# Patient Record
Sex: FEMALE | Race: WHITE | Employment: OTHER | URBAN - METROPOLITAN AREA
[De-identification: names, ages, dates, MRNs, and addresses within clinical notes are randomized per-mention and may not be internally consistent; named-entity substitution may affect disease eponyms.]

---

## 2024-01-03 NOTE — PROGRESS NOTES
[facility] History & Physical                 Reason for Visit: Establish care    Subjective:     Patient is a 78 y.o. female with a PMH of hyperlipidemia, bilateral carotid atherosclerosis, interatrial cardiac shunt (documented as PFO), cervical dystonia, CVA and thyroid cancer who presents as a referral to establish care.  The patient was last seen by cardiology at [facility] in September 2023.  It was noted that she had a normal stress test in the setting of chest pain and largely unrevealing ambulatory ECG monitor.  The patient had a TTE in March 2023 that was noted to demonstrate a normal EF and interatrial shunt.  Right-sided chambers were noted to be normal size.  She denies angina.  The patient reports ANGELA with house work and bending over at home.  She reports chronic ANGELA for years.      No past medical history on file.   No past surgical history on file.   No family history on file.   Social History     Tobacco Use    Smoking status: Not on file    Smokeless tobacco: Not on file   Substance Use Topics    Alcohol use: Not on file      Not on File      ROS:  No obvious pertinent positives on review of systems except for what was outlined above.       Objective:       BP (!) 166/84   Pulse 61   Wt 58.4 kg (128 lb 12.8 oz)     BP Readings from Last 3 Encounters:   01/04/24 (!) 166/84       Wt Readings from Last 3 Encounters:   01/04/24 58.4 kg (128 lb 12.8 oz)       General/Constitutional:   Alert and oriented x 3, no acute distress  HEENT:   normocephalic, atraumatic, moist mucous membranes  Neck:   No JVD or carotid bruits bilaterally  Cardiovascular:   regular rate and rhythm, no rub/gallop appreciated  Pulmonary:   clear to auscultation bilaterally, no respiratory distress  Abdomen:   soft, non-tender, non-distended  Ext:   No sig LE edema bilaterally  Skin:  warm and dry, no obvious rashes seen  Neuro:   no obvious sensory or motor deficits  Psychiatric:   normal mood and affect    ECG:   Sinus rhythm

## 2024-01-04 ENCOUNTER — INITIAL CONSULT (OUTPATIENT)
Age: 79
End: 2024-01-04
Payer: MEDICARE

## 2024-01-04 VITALS — WEIGHT: 128.8 LBS | DIASTOLIC BLOOD PRESSURE: 84 MMHG | SYSTOLIC BLOOD PRESSURE: 166 MMHG | HEART RATE: 61 BPM

## 2024-01-04 DIAGNOSIS — R06.09 CHRONIC DYSPNEA: ICD-10-CM

## 2024-01-04 DIAGNOSIS — Z92.89 HISTORY OF DIAGNOSTIC TESTS: ICD-10-CM

## 2024-01-04 DIAGNOSIS — Q24.8 INTERATRIAL CARDIAC SHUNT: Primary | ICD-10-CM

## 2024-01-04 PROBLEM — R69 ILL-DEFINED CONDITION: Status: ACTIVE | Noted: 2024-01-04

## 2024-01-04 PROCEDURE — G8484 FLU IMMUNIZE NO ADMIN: HCPCS | Performed by: INTERNAL MEDICINE

## 2024-01-04 PROCEDURE — 1090F PRES/ABSN URINE INCON ASSESS: CPT | Performed by: INTERNAL MEDICINE

## 2024-01-04 PROCEDURE — 99204 OFFICE O/P NEW MOD 45 MIN: CPT | Performed by: INTERNAL MEDICINE

## 2024-01-04 PROCEDURE — 1036F TOBACCO NON-USER: CPT | Performed by: INTERNAL MEDICINE

## 2024-01-04 PROCEDURE — 1123F ACP DISCUSS/DSCN MKR DOCD: CPT | Performed by: INTERNAL MEDICINE

## 2024-01-04 PROCEDURE — G8421 BMI NOT CALCULATED: HCPCS | Performed by: INTERNAL MEDICINE

## 2024-01-04 PROCEDURE — 93000 ELECTROCARDIOGRAM COMPLETE: CPT | Performed by: INTERNAL MEDICINE

## 2024-01-04 PROCEDURE — G8427 DOCREV CUR MEDS BY ELIG CLIN: HCPCS | Performed by: INTERNAL MEDICINE

## 2024-01-04 PROCEDURE — G8400 PT W/DXA NO RESULTS DOC: HCPCS | Performed by: INTERNAL MEDICINE

## 2024-01-04 RX ORDER — ALBUTEROL SULFATE 90 UG/1
2 AEROSOL, METERED RESPIRATORY (INHALATION) EVERY 6 HOURS PRN
COMMUNITY
Start: 2022-10-24

## 2024-01-04 RX ORDER — LEVOTHYROXINE SODIUM 88 MCG
TABLET ORAL
COMMUNITY

## 2024-01-04 RX ORDER — BACLOFEN 10 MG/1
TABLET ORAL
COMMUNITY
Start: 2022-03-16

## 2024-01-04 RX ORDER — ASPIRIN 81 MG/1
1 TABLET ORAL DAILY
COMMUNITY
Start: 2014-10-03

## 2024-01-04 RX ORDER — ATENOLOL 25 MG/1
25 TABLET ORAL DAILY
COMMUNITY
Start: 2022-02-18

## 2024-02-07 ENCOUNTER — OFFICE VISIT (OUTPATIENT)
Dept: NEUROLOGY | Age: 79
End: 2024-02-07
Payer: MEDICARE

## 2024-02-07 VITALS
HEART RATE: 64 BPM | BODY MASS INDEX: 21.73 KG/M2 | DIASTOLIC BLOOD PRESSURE: 94 MMHG | WEIGHT: 130.4 LBS | OXYGEN SATURATION: 98 % | HEIGHT: 65 IN | SYSTOLIC BLOOD PRESSURE: 155 MMHG

## 2024-02-07 DIAGNOSIS — G24.3 CERVICAL DYSTONIA: Primary | ICD-10-CM

## 2024-02-07 DIAGNOSIS — M62.838 MUSCLE SPASM: ICD-10-CM

## 2024-02-07 PROCEDURE — 99204 OFFICE O/P NEW MOD 45 MIN: CPT | Performed by: PSYCHIATRY & NEUROLOGY

## 2024-02-07 PROCEDURE — 1123F ACP DISCUSS/DSCN MKR DOCD: CPT | Performed by: PSYCHIATRY & NEUROLOGY

## 2024-02-07 PROCEDURE — G8484 FLU IMMUNIZE NO ADMIN: HCPCS | Performed by: PSYCHIATRY & NEUROLOGY

## 2024-02-07 PROCEDURE — G8427 DOCREV CUR MEDS BY ELIG CLIN: HCPCS | Performed by: PSYCHIATRY & NEUROLOGY

## 2024-02-07 PROCEDURE — 1090F PRES/ABSN URINE INCON ASSESS: CPT | Performed by: PSYCHIATRY & NEUROLOGY

## 2024-02-07 PROCEDURE — G8420 CALC BMI NORM PARAMETERS: HCPCS | Performed by: PSYCHIATRY & NEUROLOGY

## 2024-02-07 PROCEDURE — G8400 PT W/DXA NO RESULTS DOC: HCPCS | Performed by: PSYCHIATRY & NEUROLOGY

## 2024-02-07 PROCEDURE — 1036F TOBACCO NON-USER: CPT | Performed by: PSYCHIATRY & NEUROLOGY

## 2024-02-07 ASSESSMENT — ENCOUNTER SYMPTOMS
COUGH: 0
ABDOMINAL PAIN: 0
VOICE CHANGE: 0

## 2024-02-07 NOTE — PROGRESS NOTES
otherwise is walking unassisted with no history of recent falls.  She reports a prior MRI of the brain that showed a \"silent\" cerebellar infarct.  There was also noted to be some small vessel ischemic change.      Records show the following injection pattern from 11/27/2023:   Left scalene 40 units  Left splenius capitis 40 units  Left semispinalis 20 units  Left levator 30 units  Left trapezius 40 units  Left SCM 40 minutes  Left cervical paraspinals 5 units in 2 locations  Right SCM 40 units  Right trapezius 30 units  Right levator 10 units  Total of 300 units    Review of Systems:   Review of Systems   Constitutional:  Negative for fever.   HENT:  Negative for voice change.    Eyes:  Negative for visual disturbance.   Respiratory:  Negative for cough.    Cardiovascular:  Negative for chest pain.   Gastrointestinal:  Negative for abdominal pain.   Genitourinary:  Negative for dysuria.   Musculoskeletal:  Positive for gait problem and neck pain.   Skin:  Negative for rash.   Allergic/Immunologic: Negative for immunocompromised state.   Neurological:  Positive for tremors (cervical dystonia) and weakness.   Psychiatric/Behavioral:  Negative for hallucinations and sleep disturbance.        Lab/Imaging Review:   I REVIEWED PERTINENT LABS, IMAGES, AND REPORTS WITH THE PATIENT PERSONALLY, DIRECTLY AND FULLY. THE MOST PERTINENT FINDINGS ARE NOTED BELOW:    MRI Brain January 2021:  1. The right vertebral artery and the right PICA loop are mildly diminutive compared to the left. There is a low volume area of linear encephalomalacia in the left cerebellar hemisphere.   2. In the supratentorial department there is mild atrophy and moderate white matter disease which is nonspecific but consistent with chronic small vessel ischemia.   3. MRA neck and MRA brain are otherwise unremarkable.     MRA Head January 2021:  1. The right vertebral artery and the right PICA loop are mildly diminutive compared to the left. There is a low

## 2024-02-29 ENCOUNTER — OFFICE VISIT (OUTPATIENT)
Dept: NEUROLOGY | Age: 79
End: 2024-02-29

## 2024-02-29 VITALS
HEIGHT: 65 IN | DIASTOLIC BLOOD PRESSURE: 77 MMHG | WEIGHT: 130 LBS | SYSTOLIC BLOOD PRESSURE: 171 MMHG | HEART RATE: 80 BPM | BODY MASS INDEX: 21.66 KG/M2

## 2024-02-29 DIAGNOSIS — G24.3 CERVICAL DYSTONIA: Primary | ICD-10-CM

## 2024-02-29 NOTE — PROGRESS NOTES
[facility]UR[facility]  [address], [address]  [place] [address]  Phone: [phone] Fax [phone]  Dr. Andrade Jennings        Patient: Mrs. Kathy Turk  Provider: Andrade Jennings MD     Procedure note:  Botulinum Toxin injections     Indication: Cervical Dystonia        Subjective:      She is unaccompanied for today's visit.  She was last seen for her initial consultation earlier this month in February 2024.  See prior note for details.    Briefly, patient was previously followed at the [facility] for cervical dystonia, diagnosed in approximately 2018.  She has been receiving Botox injections over the last several years with her last injection being in November 2023.  I have reviewed prior records and prior injection patterns.  Previous trials of Xeomin are noted in the records but currently she is receiving Botox 300 units every 3 months.  She recently moved to the [place] area to be closer to her children.      Symptoms are described as a sensation and increased neck stiffness and a tendency for head pull to the left which is worse when performing activities.  She continues to take baclofen 4 times a day for symptomatic management in addition to the Botox injections.  She denies any other prior medication trials.    This will be her first injection with us.  She is well aware of the benefits and potential risks of Botox injections.  She denies any prior complications other than some mild swallowing difficulty with a previous injection. Overall injections have been well-tolerated with no adverse effects and they have been beneficial at reducing the sensation of pulling and muscle spasm within her neck.  She can notice some wearing off prior to her next injection over the last couple of weeks.    Denies any weakness or tremor of the upper extremities.  She notes some mild balance difficulty but otherwise is walking unassisted with no history of recent falls. She reports a prior MRI of

## 2024-03-14 NOTE — PROGRESS NOTES
the morning and at bedtime Zafinlukast     No current facility-administered medications for this visit.         SUBJECTIVE:  Review of Systems   Constitutional:  Negative for fatigue and unexpected weight change.   HENT:  Positive for congestion.         Positive for nasal polyp.  Positive for nasal septal deviation   Eyes: Negative.    Respiratory:  Positive for shortness of breath.         Rarely has shortness of breath has asthma   Cardiovascular: Negative.    Gastrointestinal: Negative.    Endocrine: Negative.    Genitourinary: Negative.    Musculoskeletal:  Positive for neck pain and neck stiffness.   Skin: Negative.    Allergic/Immunologic: Negative.    Neurological: Negative.    Hematological: Negative.    Psychiatric/Behavioral:  Positive for sleep disturbance.         Allergies   Allergen Reactions    Acetaminophen     Doxycycline Other (See Comments)     Skin red    Skin-Red    Other reaction(s): Other (See Comments), reaction, Unknown-Unspecified   Caused skin to turn very red   Skin red   Reaction: skin redness   Reaction: skin redness    Caused skin to turn very red    Reaction: skin redness    Hydrocodone     Streptomycin Other (See Comments)     Pins & Needles sensation   Other reaction(s): Other (See Comments), reaction, Unknown-Unspecified   Pins and needles sensation   Other reaction(s): Other (See Comments)   Pins and needles sensation   Reaction: tingling    Pins and needles sensation    Other reaction(s): Other (See Comments)   Pins and needles sensation    Reaction: tingling    Strawberry Other (See Comments)     Other reaction(s): Diarrhea-Intolerance   Other reaction(s): Diarrhea-Intolerance    Other reaction(s): Diarrhea-Intolerance    Sulfa Antibiotics Other (See Comments)     fatigue    Fatigue   Other reaction(s): Other (See Comments)   Extreme Fatigue   Other reaction(s): reaction   Other reaction(s): Other (see comments), Unknown-Unspecified   fatigue   Other reaction(s):

## 2024-03-15 ENCOUNTER — OFFICE VISIT (OUTPATIENT)
Dept: SLEEP MEDICINE | Age: 79
End: 2024-03-15
Payer: MEDICARE

## 2024-03-15 VITALS
DIASTOLIC BLOOD PRESSURE: 71 MMHG | SYSTOLIC BLOOD PRESSURE: 169 MMHG | HEART RATE: 76 BPM | OXYGEN SATURATION: 96 % | WEIGHT: 131 LBS | TEMPERATURE: 97.2 F | HEIGHT: 65 IN | RESPIRATION RATE: 16 BRPM | BODY MASS INDEX: 21.83 KG/M2

## 2024-03-15 DIAGNOSIS — R35.1 NOCTURIA: ICD-10-CM

## 2024-03-15 DIAGNOSIS — J30.89 NON-SEASONAL ALLERGIC RHINITIS, UNSPECIFIED TRIGGER: ICD-10-CM

## 2024-03-15 DIAGNOSIS — G47.33 OSA (OBSTRUCTIVE SLEEP APNEA): Primary | ICD-10-CM

## 2024-03-15 PROCEDURE — 1123F ACP DISCUSS/DSCN MKR DOCD: CPT | Performed by: INTERNAL MEDICINE

## 2024-03-15 PROCEDURE — 1036F TOBACCO NON-USER: CPT | Performed by: INTERNAL MEDICINE

## 2024-03-15 PROCEDURE — G8484 FLU IMMUNIZE NO ADMIN: HCPCS | Performed by: INTERNAL MEDICINE

## 2024-03-15 PROCEDURE — G8427 DOCREV CUR MEDS BY ELIG CLIN: HCPCS | Performed by: INTERNAL MEDICINE

## 2024-03-15 PROCEDURE — G8420 CALC BMI NORM PARAMETERS: HCPCS | Performed by: INTERNAL MEDICINE

## 2024-03-15 PROCEDURE — 1090F PRES/ABSN URINE INCON ASSESS: CPT | Performed by: INTERNAL MEDICINE

## 2024-03-15 PROCEDURE — 99204 OFFICE O/P NEW MOD 45 MIN: CPT | Performed by: INTERNAL MEDICINE

## 2024-03-15 PROCEDURE — G8400 PT W/DXA NO RESULTS DOC: HCPCS | Performed by: INTERNAL MEDICINE

## 2024-03-15 RX ORDER — CHLORAL HYDRATE 500 MG
1000 CAPSULE ORAL DAILY
COMMUNITY

## 2024-03-15 RX ORDER — ZAFIRLUKAST 20 MG/1
20 TABLET, FILM COATED ORAL 2 TIMES DAILY
COMMUNITY
Start: 2024-03-11

## 2024-03-15 RX ORDER — MAGNESIUM OXIDE/MAG AA CHELATE 300 MG
350 CAPSULE ORAL DAILY
COMMUNITY

## 2024-03-15 RX ORDER — FAMOTIDINE 20 MG
1600 TABLET ORAL DAILY
COMMUNITY

## 2024-03-15 RX ORDER — ASCORBIC ACID 500 MG
500 TABLET ORAL 2 TIMES DAILY
COMMUNITY

## 2024-03-15 ASSESSMENT — SLEEP AND FATIGUE QUESTIONNAIRES
HOW LIKELY ARE YOU TO NOD OFF OR FALL ASLEEP WHILE SITTING QUIETLY AFTER LUNCH WITHOUT ALCOHOL: 0
HOW LIKELY ARE YOU TO NOD OFF OR FALL ASLEEP WHILE WATCHING TV: 1
HOW LIKELY ARE YOU TO NOD OFF OR FALL ASLEEP WHILE SITTING AND TALKING TO SOMEONE: 0
HOW LIKELY ARE YOU TO NOD OFF OR FALL ASLEEP IN A CAR, WHILE STOPPED FOR A FEW MINUTES IN TRAFFIC: 0
HOW LIKELY ARE YOU TO NOD OFF OR FALL ASLEEP WHEN YOU ARE A PASSENGER IN A CAR FOR AN HOUR WITHOUT A BREAK: 0
ESS TOTAL SCORE: 3
HOW LIKELY ARE YOU TO NOD OFF OR FALL ASLEEP WHILE LYING DOWN TO REST IN THE AFTERNOON WHEN CIRCUMSTANCES PERMIT: 1
HOW LIKELY ARE YOU TO NOD OFF OR FALL ASLEEP WHILE SITTING AND READING: 1
HOW LIKELY ARE YOU TO NOD OFF OR FALL ASLEEP WHILE SITTING INACTIVE IN A PUBLIC PLACE: 0

## 2024-03-15 ASSESSMENT — ENCOUNTER SYMPTOMS
GASTROINTESTINAL NEGATIVE: 1
EYES NEGATIVE: 1
SHORTNESS OF BREATH: 1
ALLERGIC/IMMUNOLOGIC NEGATIVE: 1

## 2024-03-18 ENCOUNTER — TELEPHONE (OUTPATIENT)
Dept: SLEEP MEDICINE | Age: 79
End: 2024-03-18

## 2024-03-19 NOTE — TELEPHONE ENCOUNTER
Please let pt know that we are able to access her CPAP information and that she is doing well.       Thank you.

## 2024-03-27 ENCOUNTER — PATIENT MESSAGE (OUTPATIENT)
Dept: NEUROLOGY | Age: 79
End: 2024-03-27

## 2024-03-27 DIAGNOSIS — M54.2 CHRONIC NECK PAIN WITH ABNORMAL NEUROLOGIC EXAMINATION: ICD-10-CM

## 2024-03-27 DIAGNOSIS — G89.29 CHRONIC NECK PAIN WITH ABNORMAL NEUROLOGIC EXAMINATION: ICD-10-CM

## 2024-03-27 DIAGNOSIS — G24.3 CERVICAL DYSTONIA: Primary | ICD-10-CM

## 2024-03-27 DIAGNOSIS — M62.838 MUSCLE SPASM: ICD-10-CM

## 2024-03-27 NOTE — TELEPHONE ENCOUNTER
Raiza David MA 3/27/2024 1:25 PM EDT      ----- Message -----  From: Kathy Turk  Sent: 3/27/2024 1:15 PM EDT  To: *  Subject: CERVICAL DYSTONIA     Hi Dr. Jennings,    On 2/29, you gave me my 22nd series of Botox injections for Cervical Dystonia. This was the first injection time in your office. To recap, recently moved from [place].    Just wanted to report that my dystonia seems to be getting worse. I think it would be a good idea to get some pt. I see that Sukhi Underwood is a neurologic pt therapist, dealing with neurogenerative conditions and is also located at [address] [place] [address], [address]. Could you possibly get me a script to go there so it is covered by Medicare. That would be so helpful.    I'll look forward to hearing from you.    Kathy Turk  1945

## 2024-04-05 ENCOUNTER — HOSPITAL ENCOUNTER (OUTPATIENT)
Dept: PHYSICAL THERAPY | Age: 79
Setting detail: RECURRING SERIES
Discharge: HOME | End: 2024-04-08
Attending: PSYCHIATRY & NEUROLOGY
Payer: MEDICARE

## 2024-04-05 DIAGNOSIS — M54.2 CERVICALGIA: Primary | ICD-10-CM

## 2024-04-05 DIAGNOSIS — R26.89 OTHER ABNORMALITIES OF GAIT AND MOBILITY: ICD-10-CM

## 2024-04-05 DIAGNOSIS — R29.3 ABNORMAL POSTURE: ICD-10-CM

## 2024-04-05 DIAGNOSIS — R26.89 BALANCE PROBLEM: ICD-10-CM

## 2024-04-05 DIAGNOSIS — G24.3 CERVICAL DYSTONIA: ICD-10-CM

## 2024-04-05 PROCEDURE — 97162 PT EVAL MOD COMPLEX 30 MIN: CPT

## 2024-04-05 PROCEDURE — 97110 THERAPEUTIC EXERCISES: CPT

## 2024-04-05 ASSESSMENT — PAIN DESCRIPTION - LOCATION: LOCATION: NECK

## 2024-04-05 ASSESSMENT — PAIN SCALES - GENERAL: PAINLEVEL_OUTOF10: 0

## 2024-04-05 NOTE — PROGRESS NOTES
Vertigo)             Mansfield-Daroff exercises           Canalith Repositioning treatment/Epley Maneuver  for BPPV (Benign Paroxysmal Positional Vertigo)           Smart Equitest Training: See scanned report.                MANUAL THERAPY: (5 minutes):   Soft tissue mobilization was utilized and necessary because of the patient's painful spasm and restricted motion of soft tissue. Suboccipital release R>L to decrease tissue tightness and pain. Improved cervical rotation to R after.           Treatment/Session Summary:    Treatment Assessment:   Patient tolerated session well without complaints.   Communication/Consultation:  Therapy Evaluation sent to referring provider  Equipment provided today:  HEP  Recommendations/Intent for next treatment session: Next visit will focus on ROM, stretching, postural training, strengthening, manual therapy, modalities as needed, balance training.  GIVE HEP NEXT SESSION.     >Total Treatment Billable Duration:  10 minutes + evaluation  Time In: 1100  Time Out: 1145    ADITI MURRAY PT         Charge Capture  [employer] Portal  Appt Desk     Future Appointments   Date Time Provider Department Center   4/10/2024  9:30 AM Aditi Murray, PT [facility]   4/16/2024  1:00 PM Chrissy Sharp, PT [facility]   4/19/2024 11:00 AM Aditi Murray, PT [facility]   4/23/2024  1:00 PM Aditi Murray, PT [facility]   4/25/2024 11:00 AM Aditi Murray, PT [facility]   4/30/2024  1:00 PM Aditi Murray, PT [facility]   5/2/2024 10:15 AM Aditi Murray, PT [facility]   5/23/2024  1:00 PM Andrade Jennings MD [facility]   7/5/2024  1:30 PM Brooks Kellogg MD [facility]

## 2024-04-05 NOTE — THERAPY EVALUATION
Kathy Turk  : 1945  Primary: Medicare Part A And B (Medicare)  Secondary: [facility] @ [address] [place]  [place] [address]  Phone: [phone]  Fax: [phone] Plan Frequency: 1-2 times per week for 8-12 weeks    Plan of Care/Certification Expiration Date: 24        Plan of Care/Certification Expiration Date:  Plan of Care/Certification Expiration Date: 24     Frequency/Duration: Plan Frequency: 1-2 times per week for 8-12 weeks       Time In/Out:    Time In: 1100  Time Out: 1145      PT Visit Info:     Progress Note Due Date: 24  Total # of Visits to Date: 1  Progress Note Counter: 1      Visit Count:  1                OUTPATIENT PHYSICAL THERAPY:             Initial Assessment 2024               Episode (cervical dystonia)         Treatment Diagnosis:     Cervicalgia  Cervical dystonia  Abnormal posture  Balance problem  Other abnormalities of gait and mobility  Medical/Referring Diagnosis:    Cervical dystonia  Chronic neck pain with abnormal neurologic examination     Cervical dystonia [G24.3]  Chronic neck pain with abnormal neurologic examination [M54.2, G89.29]      G24.3 (ICD-10-CM) - Cervical dystonia   M54.2, G89.29 (ICD-10-CM) - Chronic neck pain with abnormal neurologic examination     Referring Physician:  Andrade Jennings MD MD Orders:  PT Eval and Treat   Return MD Appt:  May 2024  Date of Onset:  Onset Date: 18    Allergies:  Acetaminophen, Doxycycline, Hydrocodone, Streptomycin, Strawberry, Sulfa antibiotics, and Hydrocodone-acetaminophen  Restrictions/Precautions:    None      Medications Last Reviewed:  2024     SUBJECTIVE   History of Injury/Illness (Reason for Referral):      R handed.    No pain now. But neck pain when doing work. At worst 7-8, other days 4-5/10.  Now 0/10 bc hasn't done anything.     Gym: elliptical 10 minutes. Bicycle, treadmill.  Upper body weights aggravate.

## 2024-04-06 ENCOUNTER — PATIENT MESSAGE (OUTPATIENT)
Dept: NEUROLOGY | Age: 79
End: 2024-04-06

## 2024-04-09 NOTE — TELEPHONE ENCOUNTER
Raiza David MA 2024 7:47 AM EDT      ----- Message -----  From: Kathy Turk  Sent: 2024 1:14 PM EDT  To: *  Subject: OCULAR MIGRAINE     Hi Dr. Jennings,    I had my first PT appt on Friday, the . Thanks again for referral. Visit went well and I am hopeful I will have new tools to help with my Cervical Dystonia plus balance issues.     While there, was asked if I get dizzy. I said only occasionally positional vertigo but also mentioned that on this past Easter after going to gym, had three ocular migraines back to back one after the other. Each one lasted approx. 20 minutes and then next one started. My therapist thought I should mention this to you. Should I be concerned, given my small vessel disease and silent stroke? I have always had these episodes since my 20's maybe two or three times a year but three all together was very different. Have not had any since. Your thoughts please?    Sincerely,  Kathy Turk   - 1945

## 2024-04-09 NOTE — PROGRESS NOTES
PT [facility]   4/30/2024  1:00 PM Dian Murray, PT [facility]   5/2/2024 10:15 AM Dian Murray, PT [facility]   5/23/2024  1:00 PM Andrade Jennings MD [facility]   7/5/2024  1:30 PM Brooks Kellogg MD [facility] AM[facility]

## 2024-04-10 ENCOUNTER — HOSPITAL ENCOUNTER (OUTPATIENT)
Dept: PHYSICAL THERAPY | Age: 79
Setting detail: RECURRING SERIES
Discharge: HOME | End: 2024-04-13
Attending: PSYCHIATRY & NEUROLOGY
Payer: MEDICARE

## 2024-04-10 PROCEDURE — 97140 MANUAL THERAPY 1/> REGIONS: CPT

## 2024-04-10 PROCEDURE — 97112 NEUROMUSCULAR REEDUCATION: CPT

## 2024-04-10 PROCEDURE — 97110 THERAPEUTIC EXERCISES: CPT

## 2024-04-10 ASSESSMENT — PAIN DESCRIPTION - LOCATION: LOCATION: NECK

## 2024-04-10 ASSESSMENT — PAIN SCALES - GENERAL: PAINLEVEL_OUTOF10: 0

## 2024-04-16 ENCOUNTER — HOSPITAL ENCOUNTER (OUTPATIENT)
Dept: PHYSICAL THERAPY | Age: 79
Setting detail: RECURRING SERIES
Discharge: HOME | End: 2024-04-19
Attending: PSYCHIATRY & NEUROLOGY
Payer: MEDICARE

## 2024-04-16 PROCEDURE — 97140 MANUAL THERAPY 1/> REGIONS: CPT

## 2024-04-16 PROCEDURE — 97112 NEUROMUSCULAR REEDUCATION: CPT

## 2024-04-16 PROCEDURE — 97110 THERAPEUTIC EXERCISES: CPT

## 2024-04-16 ASSESSMENT — PAIN DESCRIPTION - LOCATION: LOCATION: NECK

## 2024-04-16 ASSESSMENT — PAIN SCALES - GENERAL: PAINLEVEL_OUTOF10: 0

## 2024-04-16 NOTE — PROGRESS NOTES
Kathy Turk  : 1945  Primary: Medicare Part A And B (Medicare)  Secondary: [facility] @ [address] [place]  [place] [address]  Phone: [phone]  Fax: [phone] Plan Frequency: 1-2 times per week for 8-12 weeks    Plan of Care/Certification Expiration Date: 24        Plan of Care/Certification Expiration Date:  Plan of Care/Certification Expiration Date: 24    Frequency/Duration: Plan Frequency: 1-2 times per week for 8-12 weeks      Time In/Out:   Time In: 1255  Time Out: 1340      PT Visit Info:     Progress Note Due Date: 24  Total # of Visits to Date: 3  Progress Note Counter: 3      Visit Count:  3    OUTPATIENT PHYSICAL THERAPY:   Treatment Note 2024       Episode  (cervical dystonia)               Treatment Diagnosis:    Cervicalgia  Cervical dystonia  Abnormal posture  Balance problem  Other abnormalities of gait and mobility  Medical/Referring Diagnosis:    Cervical dystonia  Chronic neck pain with abnormal neurologic examination     Cervical dystonia [G24.3]  Chronic neck pain with abnormal neurologic examination [M54.2, G89.29]      Referring Physician:  Andrade Jennings MD MD Orders:  PT Eval and Treat   Return MD Appt: May 2024    Date of Onset:  Onset Date: 18    Allergies:   Acetaminophen, Doxycycline, Hydrocodone, Streptomycin, Strawberry, Sulfa antibiotics, and Hydrocodone-acetaminophen  Restrictions/Precautions:   None      Interventions Planned (Treatment may consist of any combination of the following):     See Assessment Note    Subjective Comments:    Patient complains of tightness in her neck.   Initial Pain Level::   Neck 0 (Tightness)/10   Post Session Pain Level:     Neck 0/10   Medications Last Reviewed:  2024  Updated Objective Findings:  None Today  Treatment   THERAPEUTIC EXERCISE: (10 minutes):    Exercises per grid below to improve mobility and strength.  Required minimal

## 2024-04-19 ENCOUNTER — HOSPITAL ENCOUNTER (OUTPATIENT)
Dept: PHYSICAL THERAPY | Age: 79
Setting detail: RECURRING SERIES
Discharge: HOME | End: 2024-04-22
Attending: PSYCHIATRY & NEUROLOGY
Payer: MEDICARE

## 2024-04-19 PROCEDURE — 97110 THERAPEUTIC EXERCISES: CPT

## 2024-04-19 PROCEDURE — 97140 MANUAL THERAPY 1/> REGIONS: CPT

## 2024-04-19 PROCEDURE — 97112 NEUROMUSCULAR REEDUCATION: CPT

## 2024-04-19 ASSESSMENT — PAIN DESCRIPTION - LOCATION: LOCATION: NECK

## 2024-04-19 ASSESSMENT — PAIN SCALES - GENERAL: PAINLEVEL_OUTOF10: 0

## 2024-04-19 NOTE — PROGRESS NOTES
and cross      Marching   4 laps at rail 4 laps in hallway 4 laps in hallway      Sidestepping           Crossovers           [place]           Walking  backwards             Tandem walking           Weaving in/out of cones             Picking up cones             Sports cord             Ball toss           Kick ball           Figure 8s            Circles right/left           Walking with 360 degree turns           Spirals           Weight shifting:    Left & Right     Blue foams eyes open Blue foams eyes open       Weight shifting:  Forward & Backward      Blue foams eyes open Blue foams eyes open       Static Standing Balance             Standing with feet apart     Blue foams eyes open with head turns and eyes closed Blue foams eyes open with head turns and eyes closed Blue foams eyes open with head turns and eyes closed with head turns      Standing with feet together             Standing with feet semitandem   Blue foams eyes open and closed Blue foams eyes open and closed Blue foams eyes open and closed      Standing with feet tandem           Single leg stance           X1/X2 Viewing exercises             Hallpike-Yolanda testing for BPPV (Benign Paroxysmal Positional Vertigo)             Mansfield-Daroff exercises           Canalith Repositioning treatment/Epley Maneuver  for BPPV (Benign Paroxysmal Positional Vertigo)           Smart Equitest Training: See scanned report.                MANUAL THERAPY: (15 minutes):   Soft tissue mobilization was utilized and necessary because of the patient's painful spasm and restricted motion of soft tissue. Suboccipital release and soft tissue mobilization to B upper traps and cervical paraspinals R>L to decrease tissue tightness and pain.            Treatment/Session Summary:    Treatment Assessment:   Patient tolerated session well. She is demonstrating improved mobility and balance. She is demonstrating improved awareness of upright neutral posture with

## 2024-04-23 ENCOUNTER — HOSPITAL ENCOUNTER (OUTPATIENT)
Dept: PHYSICAL THERAPY | Age: 79
Setting detail: RECURRING SERIES
Discharge: HOME | End: 2024-04-26
Attending: PSYCHIATRY & NEUROLOGY
Payer: MEDICARE

## 2024-04-23 PROCEDURE — 97110 THERAPEUTIC EXERCISES: CPT

## 2024-04-23 PROCEDURE — 97140 MANUAL THERAPY 1/> REGIONS: CPT

## 2024-04-23 PROCEDURE — 97112 NEUROMUSCULAR REEDUCATION: CPT

## 2024-04-23 ASSESSMENT — PAIN SCALES - GENERAL: PAINLEVEL_OUTOF10: 2

## 2024-04-23 ASSESSMENT — PAIN DESCRIPTION - LOCATION: LOCATION: NECK

## 2024-04-23 NOTE — PROGRESS NOTES
patient's painful spasm and restricted motion of soft tissue. Suboccipital release and soft tissue mobilization to B upper traps and cervical paraspinals R>L to decrease tissue tightness and pain.            Treatment/Session Summary:    Treatment Assessment:   Patient tolerated session well. She is demonstrating improving balance and awareness of proper cervical posture. Patient reported decreased neck pain and tightness at end of session.  Communication/Consultation:  None today  Equipment provided today:  None  Recommendations/Intent for next treatment session: Next visit will focus on ROM, stretching, postural training, strengthening, manual therapy, modalities as needed, balance training.    >Total Treatment Billable Duration:  45 minutes   Time In: 1300  Time Out: 1345    ADITI MURRAY PT         Charge Capture  [employer] Portal  Appt Desk     Future Appointments   Date Time Provider Department Center   4/25/2024 11:00 AM Aditi Murray, PT [facility]   4/30/2024  1:00 PM Aditi Murray, PT [facility]   5/2/2024 10:15 AM Aditi Murray, PT [facility]   5/23/2024  1:00 PM Andrade Jennings MD [facility]   7/16/2024  3:00 PM Brooks Kellogg MD [facility]

## 2024-04-25 ENCOUNTER — HOSPITAL ENCOUNTER (OUTPATIENT)
Dept: PHYSICAL THERAPY | Age: 79
Setting detail: RECURRING SERIES
Discharge: HOME | End: 2024-04-28
Attending: PSYCHIATRY & NEUROLOGY
Payer: MEDICARE

## 2024-04-25 PROCEDURE — 97140 MANUAL THERAPY 1/> REGIONS: CPT

## 2024-04-25 PROCEDURE — 97110 THERAPEUTIC EXERCISES: CPT

## 2024-04-25 PROCEDURE — 97112 NEUROMUSCULAR REEDUCATION: CPT

## 2024-04-25 ASSESSMENT — PAIN SCALES - GENERAL: PAINLEVEL_OUTOF10: 1

## 2024-04-25 ASSESSMENT — PAIN DESCRIPTION - LOCATION: LOCATION: NECK

## 2024-04-25 NOTE — PROGRESS NOTES
Kathy Turk  : 1945  Primary: Medicare Part A And B (Medicare)  Secondary: [facility] @ [address] [place]  [place] [address]  Phone: [phone]  Fax: [phone] Plan Frequency: 1-2 times per week for 8-12 weeks    Plan of Care/Certification Expiration Date: 24        Plan of Care/Certification Expiration Date:  Plan of Care/Certification Expiration Date: 24    Frequency/Duration: Plan Frequency: 1-2 times per week for 8-12 weeks      Time In/Out:   Time In: 1100  Time Out: 1145      PT Visit Info:     Progress Note Due Date: 24  Total # of Visits to Date: 6  Progress Note Counter: 6      Visit Count:  6    OUTPATIENT PHYSICAL THERAPY:   Treatment Note 2024       Episode  (cervical dystonia)               Treatment Diagnosis:    Cervicalgia  Cervical dystonia  Abnormal posture  Balance problem  Other abnormalities of gait and mobility  Medical/Referring Diagnosis:    Cervical dystonia  Chronic neck pain with abnormal neurologic examination     Cervical dystonia [G24.3]  Chronic neck pain with abnormal neurologic examination [M54.2, G89.29]      Referring Physician:  Andrade Jennings MD MD Orders:  PT Eval and Treat   Return MD Appt: May 2024    Date of Onset:  Onset Date: 18    Allergies:   Acetaminophen, Doxycycline, Hydrocodone, Streptomycin, Strawberry, Sulfa antibiotics, and Hydrocodone-acetaminophen  Restrictions/Precautions:   None      Interventions Planned (Treatment may consist of any combination of the following):     See Assessment Note    Subjective Comments:   No headache today. Feel better.  Initial Pain Level::   Neck 1/10   Post Session Pain Level:     Neck 0/10   Medications Last Reviewed:  2024  Updated Objective Findings:  None Today  Treatment   THERAPEUTIC EXERCISE: (15 minutes):    Exercises per grid below to improve mobility and strength.  Required minimal verbal cues to promote proper

## 2024-04-30 ENCOUNTER — HOSPITAL ENCOUNTER (OUTPATIENT)
Dept: PHYSICAL THERAPY | Age: 79
Setting detail: RECURRING SERIES
Discharge: HOME | End: 2024-05-03
Attending: PSYCHIATRY & NEUROLOGY
Payer: MEDICARE

## 2024-04-30 PROCEDURE — 97110 THERAPEUTIC EXERCISES: CPT

## 2024-04-30 PROCEDURE — 97140 MANUAL THERAPY 1/> REGIONS: CPT

## 2024-04-30 PROCEDURE — 97112 NEUROMUSCULAR REEDUCATION: CPT

## 2024-04-30 ASSESSMENT — PAIN DESCRIPTION - LOCATION: LOCATION: NECK

## 2024-04-30 ASSESSMENT — PAIN SCALES - GENERAL: PAINLEVEL_OUTOF10: 1

## 2024-04-30 NOTE — PROGRESS NOTES
Kathy Turk  : 1945  Primary: Medicare Part A And B (Medicare)  Secondary: [facility] @ [address] [place]  [facility] [address]  Phone: [phone]  Fax: [phone] Plan Frequency: 1-2 times per week for 8-12 weeks    Plan of Care/Certification Expiration Date: 24        Plan of Care/Certification Expiration Date:  Plan of Care/Certification Expiration Date: 24    Frequency/Duration: Plan Frequency: 1-2 times per week for 8-12 weeks      Time In/Out:   Time In: 1259  Time Out: 1344      PT Visit Info:     Progress Note Due Date: 24  Total # of Visits to Date: 7  Progress Note Counter: 7      Visit Count:  7    OUTPATIENT PHYSICAL THERAPY:   Treatment Note 2024       Episode  (cervical dystonia)               Treatment Diagnosis:    Cervicalgia  Cervical dystonia  Abnormal posture  Balance problem  Other abnormalities of gait and mobility  Medical/Referring Diagnosis:    Cervical dystonia  Chronic neck pain with abnormal neurologic examination     Cervical dystonia [G24.3]  Chronic neck pain with abnormal neurologic examination [M54.2, G89.29]      Referring Physician:  Andrade Jennings MD MD Orders:  PT Eval and Treat   Return MD Appt: May 2024    Date of Onset:  Onset Date: 18    Allergies:   Acetaminophen, Doxycycline, Hydrocodone, Streptomycin, Strawberry, Sulfa antibiotics, and Hydrocodone-acetaminophen  Restrictions/Precautions:   None      Interventions Planned (Treatment may consist of any combination of the following):     See Assessment Note    Subjective Comments:   Doing okay. I think I'm doing well with the exercises.   Initial Pain Level::   Neck 1/10   Post Session Pain Level:     Neck 0/10   Medications Last Reviewed:  2024  Updated Objective Findings:  None Today  Treatment   THERAPEUTIC EXERCISE: (15 minutes):    Exercises per grid below to improve mobility and strength.  Required minimal

## 2024-05-02 ENCOUNTER — HOSPITAL ENCOUNTER (OUTPATIENT)
Dept: PHYSICAL THERAPY | Age: 79
Setting detail: RECURRING SERIES
Discharge: HOME | End: 2024-05-05
Attending: PSYCHIATRY & NEUROLOGY
Payer: MEDICARE

## 2024-05-02 PROCEDURE — 97110 THERAPEUTIC EXERCISES: CPT

## 2024-05-02 PROCEDURE — 97112 NEUROMUSCULAR REEDUCATION: CPT

## 2024-05-02 PROCEDURE — 97140 MANUAL THERAPY 1/> REGIONS: CPT

## 2024-05-02 ASSESSMENT — PAIN DESCRIPTION - LOCATION: LOCATION: NECK

## 2024-05-02 ASSESSMENT — PAIN SCALES - GENERAL: PAINLEVEL_OUTOF10: 1

## 2024-05-02 NOTE — PROGRESS NOTES
proper body alignment, promote proper body posture, and promote proper body mechanics.  Progressed resistance, range, repetitions, and complexity of movement as indicated.   Date   5/2/24   Activity/Exercise    Supine cervical rotation With head in neutral position; hold 5-10 sec, x 5 reps B   Chin tucks in supine 5 sec holds x 10 reps   Standing forward against wall/door, lifting head away off of towel    Standing with back against door/wall, working on upright posture    Pull downs green tubing x 20 reps B   rows green tubing x 20 reps B   Standing B shoulder ER (lower trap) Red band x 20 reps B       NEUROMUSCULAR RE-EDUCATION: (15 minutes):    Exercise/activities per grid below to improve balance, coordination, kinesthetic sense, posture, and proprioception.  Required minimal verbal cues to promote static and dynamic balance in standing.  Activity   Date  5/2/24 Date   Activity/Exercise   Sets/reps/  equipment Sets/reps/  equipment   Walking with head turns     4 laps    Walking with head up & down     4 laps (neutral<>down)    Step overs     Over 1/2 foam roll x 10 reps fwd and laterally    Step taps     6 inch x 20 reps fwd and cross    Marching   4 laps in hallway    Sidestepping   4 laps in hallway    Crossovers       Walden       Walking  backwards         Tandem walking       Weaving in/out of cones         Picking up cones         Sports cord         Ball toss       Kick ball       Figure 8s        Circles right/left       Walking with 360 degree turns       Spirals       Weight shifting:    Left & Right         Weight shifting:  Forward & Backward          Static Standing Balance         Standing with feet apart     Blue foams eyes open with head turns and eyes closed with head turns    Standing with feet together         Standing with feet semitandem   Blue foams eyes open and closed    Standing with feet tandem       Single leg stance       X1/X2 Viewing exercises         Hallpike-Yolanda testing for

## 2024-05-02 NOTE — THERAPY DISCHARGE
are added together for a total score of 50.       Tool Used: White Balance Scale  Score:  Initial: 50/56 Most Recent: 55/56 (Date: 5/2/24 )   Interpretation of Score: Each section is scored on a 0-4 scale, 0 representing the patient’s inability to perform the task and 4 representing independence.  The scores of each section are added together for a total score of 56.  The higher the patient’s score, the more independent the patient is.  Any score below 45 indicates increased risk for falls.    Tool Used: Functional Gait Assessment  Score:  Initial: 23/30  Most Recent: 28/30 (Date:5/2/24)   Interpretation of Score: This test is a modification of the Dynamic Gait Index.  It is a 10 item test with each item score 0-3 that assesses postural stability during various walking tasks.     Medical Necessity:   > Patient is expected to demonstrate progress in strength, range of motion, and functional technique to increase independence with daily activities.  Reason For Services/Other Comments:  > Patient continues to demonstrate capacity to improve strength, ROM, pain level which will increase independence and increase safety.      Regarding Kathy Turk's therapy, I certify that the treatment plan above will be carried out by a therapist or under their direction.  Thank you for this referral,  ADITI HOWELL PT     Referring Physician Signature: Andrade Jennings MD No Signature is Required for this note.        Charge Capture  Appt Desk

## 2024-05-23 ENCOUNTER — OFFICE VISIT (OUTPATIENT)
Dept: NEUROLOGY | Age: 79
End: 2024-05-23

## 2024-05-23 VITALS
SYSTOLIC BLOOD PRESSURE: 157 MMHG | BODY MASS INDEX: 21.8 KG/M2 | DIASTOLIC BLOOD PRESSURE: 78 MMHG | HEIGHT: 65 IN | HEART RATE: 72 BPM

## 2024-05-23 DIAGNOSIS — G24.3 CERVICAL DYSTONIA: Primary | ICD-10-CM

## 2024-05-23 DIAGNOSIS — M62.838 MUSCLE SPASM: ICD-10-CM

## 2024-05-23 NOTE — PROGRESS NOTES
[facility] NEUROLOGY  [address], [address]  [place] [address]  Phone: [phone] Fax [phone]  Dr. Andrade Jennings        Patient: Mrs. Kathy Turk  Provider: Andrade Jennings MD     Procedure note:  Botulinum Toxin injections     Indication: Cervical Dystonia        Subjective:      She is unaccompanied for today's visit.  She was last seen February 2024.  See prior notes for details.    Briefly, patient was previously followed at the [facility] for cervical dystonia, diagnosed in approximately 2018.  She has been receiving Botox injections over the last several years before moving to the [facility] to be closer to children.  She had her first injection with us in February 2024.  This will be her second injection.    Symptoms are described as a sensation and increased neck stiffness and a tendency for head pull to the left which is worse when performing activities.  She continues to take baclofen 4 times a day for symptomatic management in addition to the Botox injections.  She denies any other prior medication trials.    The last injection overall went well with improved neck stiffness and head pulling to the left.  She did note some increase in anteropulsion over a few weeks after the injection.  This subsequently improved.  She did notice some slight wearing off within the last couple of weeks prior to her next injection.  She denies any other adverse effects.  Injections to the SCM's have been more conservative due to a history of dysphagia with previous injections.  She experienced no such adverse effects with the last injection.    Denies any weakness or tremor of the upper extremities.  She notes some mild balance difficulty but otherwise is walking unassisted with no history of recent falls. She reports a prior MRI of the brain that showed a \"silent\" cerebellar infarct.  There was also noted to be some small vessel ischemic change.       Past medical history, surgical

## 2024-07-09 ENCOUNTER — PATIENT MESSAGE (OUTPATIENT)
Dept: NEUROLOGY | Age: 79
End: 2024-07-09

## 2024-07-09 DIAGNOSIS — G24.3 CERVICAL DYSTONIA: Primary | ICD-10-CM

## 2024-07-09 DIAGNOSIS — R49.0 HOARSENESS: ICD-10-CM

## 2024-07-10 NOTE — TELEPHONE ENCOUNTER
Raiza David MA 2024 4:30 PM EDT      ----- Message -----  From: Kathy Turk  Sent: 2024 4:19 PM EDT  To: *  Subject: DYSTONIA PROBLEMS     Hi Dr. Jennings:    Over the past several weeks, I have been noticing some difficulty when speaking. I have Laryngitis/horseness and voice is strained. Today, it is the worse it has ever been. Research shows it could be yet another form of dystonia in addition to my current cervical dystonia. I previously had a total thyroidectomy several years ago due to cancer. To properly diagnose what is happening, think I probably need a MRI and go from there. What are your thoughts and suggestions? I might add, cervical dystonia seems a bit worse ... my neck seems to be pushing more to the front even though I had Botox injections on 24. I am doing my pt for this and it has helped somewhat. I'll look forward to hearing from you.    Many thanks,  Kathy Turk   - 1945

## 2024-08-26 NOTE — PROGRESS NOTES
[facility] CARDIOLOGY Follow Up                 Reason for Visit: Hyperlipidemia     Subjective:      Patient is a 79 y.o. female with a PMH of hyperlipidemia, bilateral carotid atherosclerosis, interatrial cardiac shunt (documented as PFO), cervical dystonia, CVA and thyroid cancer who presents for follow-up.  The patient was last seen in 2024.  Medical records were requested of a prior stress test.  The patient had a TTE in 2023 that was noted to demonstrate a normal EF and interatrial shunt. Right-sided chambers were noted to be normal size.  The patient reports a \"tightness\" in the chest that \"I can get rid of with albuterol\".  She has chronic dyspnea and chronic chest pain.  She reports orthopnea, SOB at rest, as well as ANGELA.  According to documentation from 2023 by her prior cardiologist, the patient had a \"recent stress thallium at [facility] was read as normal\".  She denies syncope or near syncope.    Past Medical History:   Diagnosis Date    Aneurysm (HCC) Years ago    Atrial Septal    Hyperlipidemia Last few years    slight    Memory disorder     Slight    Neck pain  approx    Cervical Dystonia - neck weakness, spasams, head moves to left and forward      Past Surgical History:   Procedure Laterality Date    APPENDECTOMY      BREAST LUMPECTOMY      CATARACT REMOVAL      HYSTERECTOMY (CERVIX STATUS UNKNOWN)      THYROIDECTOMY      TONSILLECTOMY        Family History   Problem Relation Age of Onset    Diabetes Mother     Hypertension Mother     Dementia Mother         Late onset ... around 80 or so    Stroke Mother         1st - age 60, several others throughout her life ...  at 90    Heart Disease Mother     Hypertension Father     Myasthenia Gravis Father       Social History     Tobacco Use    Smoking status: Never    Smokeless tobacco: Never   Substance Use Topics    Alcohol use: Never      Allergies   Allergen Reactions    Acetaminophen     Doxycycline Other (See

## 2024-08-27 ENCOUNTER — OFFICE VISIT (OUTPATIENT)
Age: 79
End: 2024-08-27
Payer: MEDICARE

## 2024-08-27 VITALS
HEIGHT: 65 IN | BODY MASS INDEX: 22.73 KG/M2 | HEART RATE: 76 BPM | SYSTOLIC BLOOD PRESSURE: 120 MMHG | WEIGHT: 136.4 LBS | DIASTOLIC BLOOD PRESSURE: 84 MMHG

## 2024-08-27 DIAGNOSIS — Q21.12 PFO (PATENT FORAMEN OVALE): Primary | ICD-10-CM

## 2024-08-27 DIAGNOSIS — G89.29 CHRONIC CHEST PAIN: ICD-10-CM

## 2024-08-27 DIAGNOSIS — R06.09 CHRONIC DYSPNEA: ICD-10-CM

## 2024-08-27 DIAGNOSIS — I35.1 MILD AORTIC INSUFFICIENCY: ICD-10-CM

## 2024-08-27 DIAGNOSIS — R07.9 CHRONIC CHEST PAIN: ICD-10-CM

## 2024-08-27 PROCEDURE — G8400 PT W/DXA NO RESULTS DOC: HCPCS | Performed by: INTERNAL MEDICINE

## 2024-08-27 PROCEDURE — 1090F PRES/ABSN URINE INCON ASSESS: CPT | Performed by: INTERNAL MEDICINE

## 2024-08-27 PROCEDURE — G8420 CALC BMI NORM PARAMETERS: HCPCS | Performed by: INTERNAL MEDICINE

## 2024-08-27 PROCEDURE — G8428 CUR MEDS NOT DOCUMENT: HCPCS | Performed by: INTERNAL MEDICINE

## 2024-08-27 PROCEDURE — 1036F TOBACCO NON-USER: CPT | Performed by: INTERNAL MEDICINE

## 2024-08-27 PROCEDURE — 99214 OFFICE O/P EST MOD 30 MIN: CPT | Performed by: INTERNAL MEDICINE

## 2024-08-27 PROCEDURE — 1123F ACP DISCUSS/DSCN MKR DOCD: CPT | Performed by: INTERNAL MEDICINE

## 2024-08-29 ENCOUNTER — TELEPHONE (OUTPATIENT)
Age: 79
End: 2024-08-29

## 2024-08-29 ENCOUNTER — PATIENT MESSAGE (OUTPATIENT)
Age: 79
End: 2024-08-29

## 2024-08-29 ENCOUNTER — OFFICE VISIT (OUTPATIENT)
Dept: NEUROLOGY | Age: 79
End: 2024-08-29

## 2024-08-29 VITALS
BODY MASS INDEX: 22.7 KG/M2 | DIASTOLIC BLOOD PRESSURE: 69 MMHG | HEIGHT: 65 IN | SYSTOLIC BLOOD PRESSURE: 138 MMHG | HEART RATE: 82 BPM

## 2024-08-29 DIAGNOSIS — G24.3 CERVICAL DYSTONIA: Primary | ICD-10-CM

## 2024-08-29 NOTE — TELEPHONE ENCOUNTER
Pt came in office today and dropped off some medical records from previous providers and requested for  to review information.Dropped in  mailbox.

## 2024-08-29 NOTE — PROGRESS NOTES
[facility] NEUROLOGY  [address], [address]  [place] [address]  Phone: [phone] Fax [phone]  Dr. Andrade Jennings        Patient: Mrs. Kathy Turk  Provider: Andrade Jennings MD     Procedure note:  Botulinum Toxin injections     Indication: Cervical Dystonia        Subjective:      Patient presents for follow-up and chemodenervation for cervical dystonia.    She is unaccompanied for today's visit.  She was last seen May 2024.    Patient has a history of cervical dystonia, previously followed at the [facility], diagnosed in approximately 2018.  She had been receiving chemodenervation over the last several years before relocating to the [place] area to be closer to her children.      Symptoms are described as a sensation and increased neck stiffness and a tendency for head pull to the left which is worse when performing activities.  She continues to take baclofen 4 times a day for symptomatic management in addition to the Botox injections.  She denies any other prior medication trials.    Her first injection with us was in early 2024.  This will be her third injection.  The last injection overall went well with improved neck stiffness and head pulling to the left.  However she does continue to note some anteropulsion over a few weeks after the injection which subsequently improves.  There can be some wearing off within the last couple of weeks prior to her next injection.  She otherwise denies any adverse effects.  No dysphagia (although it has been reported with SCM injections in the past).    Denies any weakness or tremor of the upper extremities.  She notes some mild balance difficulty but otherwise is walking unassisted with no history of recent falls. She reports a prior MRI of the brain that showed a \"silent\" cerebellar infarct.  There was also noted to be some small vessel ischemic change.       Past medical history, surgical history, social history, family history, medications

## 2024-08-29 NOTE — TELEPHONE ENCOUNTER
Forgot to mention that on 8/10/23 was hospitalized for bad reaction to colonoscopy prep.  Exit papers showed ... DIFFERENTIAL DIAGNOSIS ... Acute MYOCARDIAL INFARCTION, dehydration, electrolyte imbalance, medication reaction, arrhythmia.  My BNP was elevated at 493 but went down to normal at 252 on 8/15/23.  Both PCP and cardiologist thought my heart OK but wanted me to follow up with you.  That being said, have another appt. today with Dr. Jennings so I am going to drop off my hospital exit papers for your file.  Still having pain and tightness with exertion so will follow up with PCP and Pulmonologist to check lungs.  At your direction, will not schedule any follow up appts. at this time.  Thanks for your assistance.      ,  she said that her exit papers from [place] in 2023 showed MI.The fu mentioned above was after previous hospital visit.She just saw you 8/27 and says that she just wanted you aware of what her paperwork said and dropped off her records.

## 2024-08-30 NOTE — TELEPHONE ENCOUNTER
Brooks Kellogg MD  You12 hours ago (7:52 PM)       As discussed with her last clinic appointment, the patient does not have a history of MI or CHF.  In fact, she followed up with a cardiologist in September 2023 who noted a normal stress test with no history of MI noted.  A differential diagnosis is a clinical process used by healthcare professionals to distinguish a particular disease or condition from others that present with similar signs and symptoms.  A condition listed on a differential diagnosis does not mean the patient has the condition.

## 2024-10-11 DIAGNOSIS — R06.09 CHRONIC DYSPNEA: Primary | ICD-10-CM

## 2024-10-15 ENCOUNTER — HOSPITAL ENCOUNTER (OUTPATIENT)
Dept: GENERAL RADIOLOGY | Age: 79
Discharge: HOME | End: 2024-10-18
Payer: MEDICARE

## 2024-10-15 ENCOUNTER — OFFICE VISIT (OUTPATIENT)
Dept: PULMONOLOGY | Age: 79
End: 2024-10-15
Payer: MEDICARE

## 2024-10-15 VITALS
SYSTOLIC BLOOD PRESSURE: 124 MMHG | BODY MASS INDEX: 22.74 KG/M2 | WEIGHT: 136.5 LBS | HEIGHT: 65 IN | TEMPERATURE: 97.1 F | HEART RATE: 67 BPM | RESPIRATION RATE: 18 BRPM | DIASTOLIC BLOOD PRESSURE: 78 MMHG | OXYGEN SATURATION: 96 %

## 2024-10-15 DIAGNOSIS — R06.09 CHRONIC DYSPNEA: ICD-10-CM

## 2024-10-15 DIAGNOSIS — R06.02 SOB (SHORTNESS OF BREATH): Primary | ICD-10-CM

## 2024-10-15 DIAGNOSIS — Q21.12 PFO (PATENT FORAMEN OVALE): ICD-10-CM

## 2024-10-15 LAB
EXPIRATORY TIME: NORMAL
FEF 25-75% %PRED-PRE: NORMAL
FEF 25-75% PRED: NORMAL
FEF 25-75-PRE: NORMAL
FEV1 %PRED-PRE: 77 %
FEV1 PRED: 1.98 L
FEV1/FVC %PRED-PRE: NORMAL
FEV1/FVC PRED: 98 %
FEV1/FVC: 75 %
FEV1: 1.52 L
FVC %PRED-PRE: 77 %
FVC PRED: 2.62 L
FVC: 2.02 L
PEF %PRED-PRE: NORMAL
PEF PRED: NORMAL
PEF-PRE: NORMAL

## 2024-10-15 PROCEDURE — 99215 OFFICE O/P EST HI 40 MIN: CPT | Performed by: INTERNAL MEDICINE

## 2024-10-15 PROCEDURE — 1123F ACP DISCUSS/DSCN MKR DOCD: CPT | Performed by: INTERNAL MEDICINE

## 2024-10-15 PROCEDURE — 94010 BREATHING CAPACITY TEST: CPT | Performed by: INTERNAL MEDICINE

## 2024-10-15 PROCEDURE — G8400 PT W/DXA NO RESULTS DOC: HCPCS | Performed by: INTERNAL MEDICINE

## 2024-10-15 PROCEDURE — 71046 X-RAY EXAM CHEST 2 VIEWS: CPT

## 2024-10-15 PROCEDURE — G8427 DOCREV CUR MEDS BY ELIG CLIN: HCPCS | Performed by: INTERNAL MEDICINE

## 2024-10-15 PROCEDURE — 1036F TOBACCO NON-USER: CPT | Performed by: INTERNAL MEDICINE

## 2024-10-15 PROCEDURE — G8484 FLU IMMUNIZE NO ADMIN: HCPCS | Performed by: INTERNAL MEDICINE

## 2024-10-15 PROCEDURE — 1090F PRES/ABSN URINE INCON ASSESS: CPT | Performed by: INTERNAL MEDICINE

## 2024-10-15 PROCEDURE — G8420 CALC BMI NORM PARAMETERS: HCPCS | Performed by: INTERNAL MEDICINE

## 2024-10-15 ASSESSMENT — PULMONARY FUNCTION TESTS
FEV1/FVC_PREDICTED: 98
FEV1/FVC: 75
FEV1_PREDICTED: 1.98
FVC_PREDICTED: 2.62
FVC_PERCENT_PREDICTED_PRE: 77
FVC: 2.02
FEV1_PERCENT_PREDICTED_PRE: 77
FEV1: 1.52

## 2024-10-15 NOTE — PROGRESS NOTES
Name:  Kathy Turk  YOB: 1945   MRN: [id number]      Office Visit: 10/15/2024       Assessment & Plan (Medical Decision Making)    Impression: 79 y.o. female with chronic shortness of breath more with bendopnea than with exertional dyspnea.    1. SOB (shortness of breath)  Bendopnea is most closely identified with heart failure though has not been felt to have significant cardiac disease per cardiology.  There has been question of possible asthma and so from my part I recommend that we do a methacholine challenge to try to rule in or rule out asthma and then we can consider controller therapy if this is true.  Her PFTs have been mostly normal other than a reduced DLCO and if her methacholine challenge is unremarkable I will get repeat complete PFTs to reevaluate her DLCO and if still abnormal or worse can consider repeat CT scan of the chest.  Thus far there is no clear evidence for a specific pulmonary disorder.  - Spirometry Without Bronchodilator    2. PFO (patent foramen ovale)  Reportedly had a right to left shunt though did not desaturate with ambulation today or even with bending over for several minutes.    No orders of the defined types were placed in this encounter.    No orders of the defined types were placed in this encounter.    Follow-up and Dispositions    Return in about 3 months (around 1/15/2025) for Dr. Mccracken or NARCISO Coy MCCT next available.       Junie Mccracken MD    No specialty comments available.  No problem-specific Assessment & Plan notes found for this encounter.      Total time for encounter on day of encounter was 40 minutes.  This time includes chart prep, review of tests/procedures, review of other provider's notes, documentation and counseling patient regarding disease process and medications. _________________________________________________________________________    HISTORY OF PRESENT ILLNESS:    Ms. Kathy Turk is a 79 y.o. female who is seen at [facility]  No

## 2024-10-28 ENCOUNTER — TELEPHONE (OUTPATIENT)
Dept: PULMONOLOGY | Age: 79
End: 2024-10-28

## 2024-10-28 NOTE — TELEPHONE ENCOUNTER
Patient has not heard anything and needs to know what she needs to do . She doesn't want to duplicate test with another doctor        Please read previous messages from patient and Tracey KAPOOR

## 2024-10-31 ENCOUNTER — NURSE ONLY (OUTPATIENT)
Dept: PULMONOLOGY | Age: 79
End: 2024-10-31

## 2024-10-31 DIAGNOSIS — R06.02 SOB (SHORTNESS OF BREATH): Primary | ICD-10-CM

## 2024-10-31 NOTE — PROGRESS NOTES
Methacholine challenge performed. Pt stated she followed all instructions prior to testing. Results reviewed by Dr. Mccracken. Patient did state she took Zarfirlukast around 0700 this morning.

## 2024-11-01 ENCOUNTER — TELEPHONE (OUTPATIENT)
Age: 79
End: 2024-11-01

## 2024-11-01 DIAGNOSIS — R06.02 SOB (SHORTNESS OF BREATH): Primary | ICD-10-CM

## 2024-11-01 NOTE — TELEPHONE ENCOUNTER
Spoke with the patient in regards to their Methacholine Challenge results, explained per Dr. Mccracken that the MCCT is normal with no suggestions of asthma and that Dr. Mccracken would like to order a HRCT for further evaluation since her prior DLCO was low.  Patient verbalized understanding of results and was agreeable with the HRCT.  Order has been established and no further questions or concerns were asked at this time.  // Melyssa Velasco M.A.

## 2024-11-01 NOTE — TELEPHONE ENCOUNTER
----- Message from Dr. Junie Mccracken MD sent at 10/31/2024 12:46 PM EDT -----  MCCT is normal. No suggestion of asthma. Would offer HRCT to evaluate for alternative lung disease and her prior low DLCO.  ----- Message -----  From: Melyssa Patricia RCP  Sent: 10/31/2024  12:06 PM EDT  To: Junie Mccracken MD

## 2024-11-01 NOTE — TELEPHONE ENCOUNTER
Left patient message via voicemail to please give me a call as soon as they are available. // Melyssa Velasco M.A.

## 2024-11-07 ENCOUNTER — HOSPITAL ENCOUNTER (OUTPATIENT)
Dept: MAMMOGRAPHY | Age: 79
Discharge: HOME | End: 2024-11-10
Payer: MEDICARE

## 2024-11-07 VITALS — WEIGHT: 130 LBS | BODY MASS INDEX: 21.63 KG/M2

## 2024-11-07 DIAGNOSIS — Z12.31 VISIT FOR SCREENING MAMMOGRAM: ICD-10-CM

## 2024-11-07 PROCEDURE — 77067 SCR MAMMO BI INCL CAD: CPT

## 2024-11-08 ENCOUNTER — TELEPHONE (OUTPATIENT)
Dept: PULMONOLOGY | Age: 79
End: 2024-11-08

## 2024-11-08 DIAGNOSIS — R06.02 SOB (SHORTNESS OF BREATH): Primary | ICD-10-CM

## 2024-11-08 RX ORDER — DIAZEPAM 2 MG/1
2 TABLET ORAL EVERY 8 HOURS PRN
Qty: 2 TABLET | Refills: 0 | Status: SHIPPED | OUTPATIENT
Start: 2024-11-08 | End: 2024-11-09

## 2024-11-08 NOTE — TELEPHONE ENCOUNTER
Dr. Mccracken, please advise if you are alright writing a prescription for Valium for this patient.  Thank you so much! // Melyssa CADRENAS

## 2024-11-08 NOTE — TELEPHONE ENCOUNTER
Patient has Involuntary neck movement . She will need 1 to 2 valium to help keep her neck still during the CT scan

## 2024-11-18 ENCOUNTER — TELEPHONE (OUTPATIENT)
Dept: PULMONOLOGY | Age: 79
End: 2024-11-18

## 2024-12-05 ENCOUNTER — OFFICE VISIT (OUTPATIENT)
Dept: NEUROLOGY | Age: 79
End: 2024-12-05

## 2024-12-05 DIAGNOSIS — G24.3 CERVICAL DYSTONIA: Primary | ICD-10-CM

## 2024-12-05 NOTE — PROGRESS NOTES
[facility] NEUROLOGY  [address], [address]  [place] [address]  Phone: [phone] Fax [phone]  Dr. Andrade Jennings        Patient: Mrs. Kathy Turk  Provider: Andrade Jennings MD     Procedure note:  Botulinum Toxin injections     Indication: Cervical Dystonia        Subjective:      Patient presents for follow-up and chemodenervation for cervical dystonia.    She is unaccompanied for today's visit.  She was last seen August 2024.    Patient has a history of cervical dystonia, previously followed at the [facility], diagnosed in approximately 2018.  She had been receiving chemodenervation over the last several years before relocating to the [place] area to be closer to her children.      Symptoms are described as a sensation and increased neck stiffness and a tendency for head pull to the left which is worse when performing activities.  She continues to take baclofen 4 times a day for symptomatic management in addition to the Botox injections.  She denies any other prior medication trials.    Her first injection with us was in early 2024.  This will be her fourth injection.  The last injection overall went well with improved neck stiffness and head pulling to the left.  Improvement in anteropulsion. There can be some wearing off within the last couple of weeks prior to her next injection.  She otherwise denies any adverse effects.  No dysphagia (although it has been reported with SCM injections in the past).    Reports worsening shortness of breath worsened by postural changes such as bending over.  Cardiac and pulmonary workups have been without any obvious cause.  This has led to questions as to whether or not her dystonia may be the problem.    Denies any weakness or tremor of the upper extremities.  She notes some mild balance difficulty but otherwise is walking unassisted with no history of recent falls. She reports a prior MRI of the brain that showed a \"silent\" cerebellar

## 2025-02-04 ENCOUNTER — OFFICE VISIT (OUTPATIENT)
Dept: PULMONOLOGY | Age: 80
End: 2025-02-04
Payer: MEDICARE

## 2025-02-04 VITALS
HEIGHT: 65 IN | HEART RATE: 67 BPM | DIASTOLIC BLOOD PRESSURE: 60 MMHG | BODY MASS INDEX: 22.66 KG/M2 | WEIGHT: 136 LBS | RESPIRATION RATE: 20 BRPM | SYSTOLIC BLOOD PRESSURE: 124 MMHG | TEMPERATURE: 98 F | OXYGEN SATURATION: 97 %

## 2025-02-04 DIAGNOSIS — R06.02 SOB (SHORTNESS OF BREATH): Primary | ICD-10-CM

## 2025-02-04 DIAGNOSIS — Q21.12 PFO (PATENT FORAMEN OVALE): ICD-10-CM

## 2025-02-04 PROCEDURE — 99214 OFFICE O/P EST MOD 30 MIN: CPT | Performed by: INTERNAL MEDICINE

## 2025-02-04 PROCEDURE — 1036F TOBACCO NON-USER: CPT | Performed by: INTERNAL MEDICINE

## 2025-02-04 PROCEDURE — 1126F AMNT PAIN NOTED NONE PRSNT: CPT | Performed by: INTERNAL MEDICINE

## 2025-02-04 PROCEDURE — 1159F MED LIST DOCD IN RCRD: CPT | Performed by: INTERNAL MEDICINE

## 2025-02-04 PROCEDURE — 1090F PRES/ABSN URINE INCON ASSESS: CPT | Performed by: INTERNAL MEDICINE

## 2025-02-04 PROCEDURE — G8420 CALC BMI NORM PARAMETERS: HCPCS | Performed by: INTERNAL MEDICINE

## 2025-02-04 PROCEDURE — G8427 DOCREV CUR MEDS BY ELIG CLIN: HCPCS | Performed by: INTERNAL MEDICINE

## 2025-02-04 PROCEDURE — 1123F ACP DISCUSS/DSCN MKR DOCD: CPT | Performed by: INTERNAL MEDICINE

## 2025-02-04 PROCEDURE — G8400 PT W/DXA NO RESULTS DOC: HCPCS | Performed by: INTERNAL MEDICINE

## 2025-02-04 RX ORDER — BUDESONIDE AND FORMOTEROL FUMARATE DIHYDRATE 160; 4.5 UG/1; UG/1
2 AEROSOL RESPIRATORY (INHALATION) 2 TIMES DAILY
Qty: 1 EACH | Refills: 11 | Status: SHIPPED | OUTPATIENT
Start: 2025-02-04

## 2025-02-04 NOTE — PATIENT INSTRUCTIONS
We understand that insurance companies have different 'preferred' medications. These preferences can change yearly and can be difficult to track. Depending upon your insurance and their preferred medicines, some medications we prescribe may be too expensive. If this happens, we recommend that you find out what inhalers for COPD or asthma are \"preferred\" on your insurance drug formulary by calling the member services phone number on the back of the insurance card.  The cost of your medication can be dramatically different depending on this.      Once the preferred inhalers are known, please send us a message in [facility] or call us back at [phone] with this information.  We will then choose the best option available for you and send that prescription to your pharmacy on file.    ICS/LABA Inhalers:  Fluticasone/Salmeterol inhaler (Advair, Airduo, Wixela)  Symbicort  Dulera  Breo

## 2025-02-04 NOTE — PROGRESS NOTES
Name:  Kathy Turk  YOB: 1945   MRN: [id number]      Office Visit: 2/4/2025       Assessment & Plan (Medical Decision Making)    Impression: 79 y.o. female with chronic shortness of breath more with bendopnea than with exertional dyspnea.      ICD-10-CM    1. SOB (shortness of breath)  R06.02 budesonide-formoterol (BREYNA) 160-4.5 MCG/ACT AERO      2. PFO (patent foramen ovale)  Q21.12          Assessment & Plan  1. Dyspnea: Stable, not better. PFTs normal except low DLCO. Normal methacholine challenge. High-resolution CT scan unremarkable. No evidence of lung disease. Can discuss side effects of atenolol and Botox as contributors to dyspnea and chest tightness.   - Discuss potential side effects of atenolol and Botox with neurologist and cardiologist.  - We will trial Symbicort/Breyna bid for 3 months   - Follow up in 3 months with C PFTs and MIP/MEPs    2. Cervical Dystonia, PFO: other possible causes of reported symptoms. Pectus appears to be mild and should not be symptomatic. No significant interstitial or fibrotic changes on CT scan.     Follow-up  - Discuss with neurologist and cardiologist regarding medication side effects.    No orders of the defined types were placed in this encounter.    Follow-up and Dispositions    Return in about 3 months (around 5/4/2025) for With CPFTs with MIP/MEP, Dr. Mccracken or NARCISO Coy.       Junie Mccracken MD    No specialty comments available.  No problem-specific Assessment & Plan notes found for this encounter.      The patient (or guardian, if applicable) and other individuals in attendance with the patient were advised that Artificial Intelligence will be utilized during this visit to record, process the conversation to generate a clinical note, and support improvement of the AI technology. The patient (or guardian, if applicable) and other individuals in attendance at the appointment consented to the use of AI, including the recording.

## 2025-02-28 ENCOUNTER — HOSPITAL ENCOUNTER (OUTPATIENT)
Dept: ULTRASOUND IMAGING | Age: 80
Discharge: HOME | End: 2025-02-28
Payer: MEDICARE

## 2025-02-28 DIAGNOSIS — N28.1 RENAL CYST, ACQUIRED, RIGHT: ICD-10-CM

## 2025-02-28 PROCEDURE — 76775 US EXAM ABDO BACK WALL LIM: CPT

## 2025-03-11 ENCOUNTER — OFFICE VISIT (OUTPATIENT)
Dept: ENT CLINIC | Age: 80
End: 2025-03-11
Payer: MEDICARE

## 2025-03-11 VITALS
DIASTOLIC BLOOD PRESSURE: 84 MMHG | RESPIRATION RATE: 16 BRPM | HEIGHT: 65 IN | BODY MASS INDEX: 22.66 KG/M2 | SYSTOLIC BLOOD PRESSURE: 126 MMHG | WEIGHT: 136 LBS

## 2025-03-11 DIAGNOSIS — J38.7 PRESBYLARYNX: ICD-10-CM

## 2025-03-11 DIAGNOSIS — G24.3 CERVICAL DYSTONIA: ICD-10-CM

## 2025-03-11 DIAGNOSIS — R06.09 CHRONIC DYSPNEA: Primary | ICD-10-CM

## 2025-03-11 PROCEDURE — 99204 OFFICE O/P NEW MOD 45 MIN: CPT | Performed by: STUDENT IN AN ORGANIZED HEALTH CARE EDUCATION/TRAINING PROGRAM

## 2025-03-11 PROCEDURE — 1090F PRES/ABSN URINE INCON ASSESS: CPT | Performed by: STUDENT IN AN ORGANIZED HEALTH CARE EDUCATION/TRAINING PROGRAM

## 2025-03-11 PROCEDURE — G8427 DOCREV CUR MEDS BY ELIG CLIN: HCPCS | Performed by: STUDENT IN AN ORGANIZED HEALTH CARE EDUCATION/TRAINING PROGRAM

## 2025-03-11 PROCEDURE — 31575 DIAGNOSTIC LARYNGOSCOPY: CPT | Performed by: STUDENT IN AN ORGANIZED HEALTH CARE EDUCATION/TRAINING PROGRAM

## 2025-03-11 PROCEDURE — 1160F RVW MEDS BY RX/DR IN RCRD: CPT | Performed by: STUDENT IN AN ORGANIZED HEALTH CARE EDUCATION/TRAINING PROGRAM

## 2025-03-11 PROCEDURE — 1036F TOBACCO NON-USER: CPT | Performed by: STUDENT IN AN ORGANIZED HEALTH CARE EDUCATION/TRAINING PROGRAM

## 2025-03-11 PROCEDURE — 1159F MED LIST DOCD IN RCRD: CPT | Performed by: STUDENT IN AN ORGANIZED HEALTH CARE EDUCATION/TRAINING PROGRAM

## 2025-03-11 PROCEDURE — G8420 CALC BMI NORM PARAMETERS: HCPCS | Performed by: STUDENT IN AN ORGANIZED HEALTH CARE EDUCATION/TRAINING PROGRAM

## 2025-03-11 PROCEDURE — G8400 PT W/DXA NO RESULTS DOC: HCPCS | Performed by: STUDENT IN AN ORGANIZED HEALTH CARE EDUCATION/TRAINING PROGRAM

## 2025-03-11 PROCEDURE — 1123F ACP DISCUSS/DSCN MKR DOCD: CPT | Performed by: STUDENT IN AN ORGANIZED HEALTH CARE EDUCATION/TRAINING PROGRAM

## 2025-03-11 RX ORDER — OMEPRAZOLE 20 MG/1
CAPSULE, DELAYED RELEASE ORAL
COMMUNITY
Start: 2025-02-10

## 2025-03-11 RX ORDER — FLUTICASONE PROPIONATE 50 MCG
SPRAY, SUSPENSION (ML) NASAL
COMMUNITY
Start: 2024-08-02

## 2025-03-11 ASSESSMENT — ENCOUNTER SYMPTOMS
SINUS PAIN: 0
DIARRHEA: 0
APNEA: 0
SINUS PRESSURE: 0
EYE PAIN: 0
SHORTNESS OF BREATH: 0
CHOKING: 0
STRIDOR: 0
EYE DISCHARGE: 0
VOICE CHANGE: 1
COUGH: 0
FACIAL SWELLING: 0
NAUSEA: 0
EYE ITCHING: 0
WHEEZING: 0
RHINORRHEA: 1
CONSTIPATION: 0

## 2025-03-11 NOTE — PROGRESS NOTES
HPI:    Kathy Turk is a 80 y.o. female seen New    Chief Complaint   Patient presents with    New Patient     Patient presents today with c/o cervical dystonia that she feels is now causing issues with her voice. She has had swallow test in 2020 that notes reflux and moderate dysmotility in mid to distal esophagus. Patient has history of nasal septum deviation , enlarged turbinates , rhinitis , and tinnitus . Patient diagnosed with SNHL of both ears by previous ENT in [place] . She is scheduled to see audiology next month.      History of Present Illness  80-year-old female presents for new patient evaluation for ENT concerns. History of nasal sinus symptoms, deviated septum, hearing loss, tinnitus, and worsening voice issues.    Suspects respiratory dystonia, linked to existing cervical dystonia. Experiences difficulty inhaling, chest tightness, and pain during household activities. Cardiologist ruled out cardiac issues. Pulmonologist prescribed steroid inhaler and plans muscle testing. Breathing tests typically require 3 attempts.    Receives Botox injections every 3 months for several years, with diminishing efficacy. Experiences neck muscle twitches, affecting head position and possibly larynx, worsening breathing difficulties. Neurology appointment on Thursday. Last Botox injection 3 months ago.    History of thyroid cancer, treated with thyroidectomy in 2011, no voice impact. Consistent hoarseness, took Flonase today.    Swallow study done a couple of years ago. History of reflux, improving with omeprazole.    FAMILY HISTORY  Her father had myasthenia gravis.    MEDICATIONS  Current: omeprazole, Flonase        Past Medical History, Past Surgical History, Family history, Social History, and Medications were all reviewed with the patient today and updated as necessary.     Allergies   Allergen Reactions    Acetaminophen     Doxycycline Other (See Comments)     Skin red    Skin-Red    Other reaction(s): Other

## 2025-03-13 ENCOUNTER — OFFICE VISIT (OUTPATIENT)
Dept: NEUROLOGY | Age: 80
End: 2025-03-13

## 2025-03-13 VITALS
SYSTOLIC BLOOD PRESSURE: 162 MMHG | HEIGHT: 65 IN | DIASTOLIC BLOOD PRESSURE: 67 MMHG | BODY MASS INDEX: 22.63 KG/M2 | HEART RATE: 72 BPM

## 2025-03-13 DIAGNOSIS — M62.838 MUSCLE SPASM: ICD-10-CM

## 2025-03-13 DIAGNOSIS — G24.3 CERVICAL DYSTONIA: Primary | ICD-10-CM

## 2025-03-13 NOTE — PROGRESS NOTES
Onabotulinumtoxin A (BOTOX) injection 200 Units  -     onabotulinumtoxinA (BOTOX) injection 100 Units  -     88989 - Chemodenervation of neck muscle(s) (BILATERAL)  -     62903 - EMG, chemodenervation    Muscle spasm      Proceed with botulinum toxin injections as noted below.      We had a long discussion regarding her respiratory symptoms and unfortunately I am not able to identify any obvious neurologic cause.  We discussed the possibilities of a respiratory dystonia but there is no real objective evidence for this.      Evaluations for other causes have been unremarkable and she has become quite frustrated at the lack of a diagnosis. The best I could offer her would be a referral to therapy for breathing exercises in the case there is some kind of dystonic component to her complaints.  Will look into available options.      RTC 3 months for review and re-injection.        Procedure:       Informed consent was obtained after the potential risks and benefits have been explained to the patient.  Potential risks include:  Pain, bruising, bleeding, infection, flu-like symptoms, over-weakening of injected or adjacent muscles and swallowing dysfunction. Patient was given the botulinum toxin medication guide according to FDA standards.      Procedure:  Using a 30 gauge 1.4 inch hollow lumen recording needle on a tuberculin syringe was used to inject bolutinum toxin in the muscles noted below, The following muscles were sampled utilizing EMG and injected as noted:       Injection:                                                        Left splenius capitis   60 units, EMG 2+  Left splenus cervicis  30 units, EMG 2+  Left scalene   25 units, EMG 2+  Left levator    30 units, EMG 2+  Left trapezius    15 units, EMG 2+  Left SCM    30 units, EMG 2+    Right SCM    60 units, EMG 2+  Right trapezius   20 units, EMG 2+  Right levator    20 units, EMG 2+       Total injected: 290 units BOTOX 100units/1 cc under EMG guidance.

## 2025-03-20 NOTE — PROGRESS NOTES
[facility]: f/u Visit  [address]  [place] [address] [phone]    Patient Name:  Kathy Turk    YOB: 1945            Date of Service:  3/24/2025    Chief Complaint   Patient presents with    Follow-up    Sleep Apnea       History of Present Illness:  This pleasant lady came in today for a follow-up visit regarding her sleep apnea.    To recap:  The patient previously diagnosed sleep apnea in 2013 with AHI of 14.3.  Patient is currently on auto CPAP at 5-15 cm H2O with C-Flex of 2.  Patient has been compliant PAP therapy in the past.    At this time:  Patient currently using her CPAP 365 out of 365 days or 100% of the time.  Days more than 4 hours are 362 days or 99% of the time.  Average sleep is 8 hours and 3 minutes.  95th percentile pressure is 13.1 cm H2O.  95th percentile air leak is 1.5 L/min and AHI is down to 1.5.    Patient currently reports that she has no problems with her airway pressure, her mask or humidity.    Patient is just wanting to clarify what she needs to do in order to clean the machine and equipment.    [place] score is 3/24        3/24/2025     2:03 PM 3/15/2024    12:31 PM   Sleep Medicine   Sitting and reading 1 1   Watching TV 1 1   Sitting, inactive in a public place (e.g. a theatre or a meeting) 0 0   As a passenger in a car for an hour without a break 0 0   Lying down to rest in the afternoon when circumstances permit 1 1   Sitting and talking to someone 0 0   Sitting quietly after a lunch without alcohol 0 0   In a car, while stopped for a few minutes in traffic 0 0   [place] Sleepiness Score 3 3     Download:       DIAGNOSTIC TESTS/RESULTS      SLEEP STUDIES    Date/Time: 12/21/2022 8:02 AM  Performed by: Celine Jensen MD  Authorized by: Celine Jensen MD    Study Type: PSG- Routine Study    Impression:  Polysomnography report 12/14/2022    Total sleep time 107 minutes  Total stage REM 7%  Latency to sleep onset 7.5

## 2025-03-24 ENCOUNTER — OFFICE VISIT (OUTPATIENT)
Dept: SLEEP MEDICINE | Age: 80
End: 2025-03-24
Payer: MEDICARE

## 2025-03-24 VITALS
BODY MASS INDEX: 22.99 KG/M2 | WEIGHT: 138 LBS | HEART RATE: 70 BPM | OXYGEN SATURATION: 96 % | SYSTOLIC BLOOD PRESSURE: 159 MMHG | DIASTOLIC BLOOD PRESSURE: 81 MMHG | HEIGHT: 65 IN | RESPIRATION RATE: 18 BRPM | TEMPERATURE: 97 F

## 2025-03-24 DIAGNOSIS — G47.33 OSA (OBSTRUCTIVE SLEEP APNEA): Primary | ICD-10-CM

## 2025-03-24 PROCEDURE — G8400 PT W/DXA NO RESULTS DOC: HCPCS | Performed by: INTERNAL MEDICINE

## 2025-03-24 PROCEDURE — G2211 COMPLEX E/M VISIT ADD ON: HCPCS | Performed by: INTERNAL MEDICINE

## 2025-03-24 PROCEDURE — 1090F PRES/ABSN URINE INCON ASSESS: CPT | Performed by: INTERNAL MEDICINE

## 2025-03-24 PROCEDURE — G8427 DOCREV CUR MEDS BY ELIG CLIN: HCPCS | Performed by: INTERNAL MEDICINE

## 2025-03-24 PROCEDURE — 1123F ACP DISCUSS/DSCN MKR DOCD: CPT | Performed by: INTERNAL MEDICINE

## 2025-03-24 PROCEDURE — 1159F MED LIST DOCD IN RCRD: CPT | Performed by: INTERNAL MEDICINE

## 2025-03-24 PROCEDURE — 1160F RVW MEDS BY RX/DR IN RCRD: CPT | Performed by: INTERNAL MEDICINE

## 2025-03-24 PROCEDURE — 99213 OFFICE O/P EST LOW 20 MIN: CPT | Performed by: INTERNAL MEDICINE

## 2025-03-24 PROCEDURE — G8420 CALC BMI NORM PARAMETERS: HCPCS | Performed by: INTERNAL MEDICINE

## 2025-03-24 PROCEDURE — 1036F TOBACCO NON-USER: CPT | Performed by: INTERNAL MEDICINE

## 2025-03-24 ASSESSMENT — SLEEP AND FATIGUE QUESTIONNAIRES
HOW LIKELY ARE YOU TO NOD OFF OR FALL ASLEEP IN A CAR, WHILE STOPPED FOR A FEW MINUTES IN TRAFFIC: WOULD NEVER DOZE
HOW LIKELY ARE YOU TO NOD OFF OR FALL ASLEEP WHILE SITTING AND TALKING TO SOMEONE: WOULD NEVER DOZE
ESS TOTAL SCORE: 3
HOW LIKELY ARE YOU TO NOD OFF OR FALL ASLEEP WHILE LYING DOWN TO REST IN THE AFTERNOON WHEN CIRCUMSTANCES PERMIT: SLIGHT CHANCE OF DOZING
HOW LIKELY ARE YOU TO NOD OFF OR FALL ASLEEP WHILE SITTING QUIETLY AFTER LUNCH WITHOUT ALCOHOL: WOULD NEVER DOZE
HOW LIKELY ARE YOU TO NOD OFF OR FALL ASLEEP WHEN YOU ARE A PASSENGER IN A CAR FOR AN HOUR WITHOUT A BREAK: WOULD NEVER DOZE
HOW LIKELY ARE YOU TO NOD OFF OR FALL ASLEEP WHILE WATCHING TV: SLIGHT CHANCE OF DOZING
HOW LIKELY ARE YOU TO NOD OFF OR FALL ASLEEP WHILE SITTING INACTIVE IN A PUBLIC PLACE: WOULD NEVER DOZE
HOW LIKELY ARE YOU TO NOD OFF OR FALL ASLEEP WHILE SITTING AND READING: SLIGHT CHANCE OF DOZING

## 2025-04-15 ENCOUNTER — TELEPHONE (OUTPATIENT)
Dept: PULMONOLOGY | Age: 80
End: 2025-04-15

## 2025-04-15 NOTE — TELEPHONE ENCOUNTER
Called patient to offer her a work in appointment for this afternoon with Dr. Mccracken and patient declined stating that she had other appointments scheduled for this afternoon.  Patient then was going to rearrange her schedule to try to make it work but since is unable to get the cPFTs done at the same time decided against it.  I offered to get patient in tomorrow morning with Ms Andrade Emmanuel and do the cPFTs after the appointment and patient stated she would let me know as she was going to try to get in with a provider at the [facility].  Patient stated that she thought we only had 2 actual physicians on staff here and I informed her that we had multiple physicians as well as NP's and I could get her scheduled with the next available provider.  Patient stated she would call back if she was unable to get an appointment at [facility] that worked better with her schedule.

## 2025-04-28 ENCOUNTER — OFFICE VISIT (OUTPATIENT)
Dept: ENT CLINIC | Age: 80
End: 2025-04-28
Payer: MEDICARE

## 2025-04-28 ENCOUNTER — OFFICE VISIT (OUTPATIENT)
Dept: AUDIOLOGY | Age: 80
End: 2025-04-28
Payer: MEDICARE

## 2025-04-28 VITALS
RESPIRATION RATE: 16 BRPM | HEART RATE: 72 BPM | HEIGHT: 65 IN | WEIGHT: 139 LBS | OXYGEN SATURATION: 97 % | BODY MASS INDEX: 23.16 KG/M2

## 2025-04-28 DIAGNOSIS — H90.3 SENSORINEURAL HEARING LOSS, BILATERAL: Primary | ICD-10-CM

## 2025-04-28 DIAGNOSIS — H90.3 SENSORINEURAL HEARING LOSS (SNHL) OF BOTH EARS: Primary | ICD-10-CM

## 2025-04-28 PROCEDURE — 1159F MED LIST DOCD IN RCRD: CPT | Performed by: STUDENT IN AN ORGANIZED HEALTH CARE EDUCATION/TRAINING PROGRAM

## 2025-04-28 PROCEDURE — G8427 DOCREV CUR MEDS BY ELIG CLIN: HCPCS | Performed by: STUDENT IN AN ORGANIZED HEALTH CARE EDUCATION/TRAINING PROGRAM

## 2025-04-28 PROCEDURE — 1036F TOBACCO NON-USER: CPT | Performed by: STUDENT IN AN ORGANIZED HEALTH CARE EDUCATION/TRAINING PROGRAM

## 2025-04-28 PROCEDURE — 92557 COMPREHENSIVE HEARING TEST: CPT | Performed by: AUDIOLOGIST

## 2025-04-28 PROCEDURE — G8400 PT W/DXA NO RESULTS DOC: HCPCS | Performed by: STUDENT IN AN ORGANIZED HEALTH CARE EDUCATION/TRAINING PROGRAM

## 2025-04-28 PROCEDURE — 99213 OFFICE O/P EST LOW 20 MIN: CPT | Performed by: STUDENT IN AN ORGANIZED HEALTH CARE EDUCATION/TRAINING PROGRAM

## 2025-04-28 PROCEDURE — G8420 CALC BMI NORM PARAMETERS: HCPCS | Performed by: STUDENT IN AN ORGANIZED HEALTH CARE EDUCATION/TRAINING PROGRAM

## 2025-04-28 PROCEDURE — 1160F RVW MEDS BY RX/DR IN RCRD: CPT | Performed by: STUDENT IN AN ORGANIZED HEALTH CARE EDUCATION/TRAINING PROGRAM

## 2025-04-28 PROCEDURE — 1123F ACP DISCUSS/DSCN MKR DOCD: CPT | Performed by: STUDENT IN AN ORGANIZED HEALTH CARE EDUCATION/TRAINING PROGRAM

## 2025-04-28 PROCEDURE — 1090F PRES/ABSN URINE INCON ASSESS: CPT | Performed by: STUDENT IN AN ORGANIZED HEALTH CARE EDUCATION/TRAINING PROGRAM

## 2025-04-28 ASSESSMENT — ENCOUNTER SYMPTOMS
NAUSEA: 0
FACIAL SWELLING: 0
SINUS PAIN: 0
CONSTIPATION: 0
COUGH: 0
EYE DISCHARGE: 0
CHOKING: 0
WHEEZING: 0
STRIDOR: 0
APNEA: 0
EYE ITCHING: 0
DIARRHEA: 0
SHORTNESS OF BREATH: 0
EYE PAIN: 0
SINUS PRESSURE: 0

## 2025-04-28 NOTE — PROGRESS NOTES
HPI:    Kathy Turk is a 80 y.o. female seen Established   Chief Complaint   Patient presents with    Hearing Problem     Patient presents today with c/o bilateral HL . Patient states that she and family has noticed decrease .        History of Present Illness  80-year-old female presents for follow-up of chronic hearing loss. Recently evaluated for long-term dysphonia with normal laryngoscopy. Reports slow progressive hearing changes noted by her and family.    Audiological exam 2 years ago was satisfactory, but she now observes declining auditory acuity, particularly in distinguishing voices, leading to occasional conversational misunderstandings with her . Interested in hearing aids and aware of risks of improperly set over-the-counter devices.    Supplemental Information  Reports fluctuating oxygen levels (73-93) at home. Receiving respiratory therapy prescribed by neurologist for suspected cardiac-related weakness. Scheduled to consult pulmonologist.        Past Medical History, Past Surgical History, Family history, Social History, and Medications were all reviewed with the patient today and updated as necessary.     Allergies   Allergen Reactions    Acetaminophen     Doxycycline Other (See Comments)     Skin red    Skin-Red    Other reaction(s): Other (See Comments), reaction, Unknown-Unspecified   Caused skin to turn very red   Skin red   Reaction: skin redness   Reaction: skin redness    Caused skin to turn very red    Reaction: skin redness    Other Reaction(s): Unknown    Hydrocodone     Streptomycin Other (See Comments)     Pins & Needles sensation   Other reaction(s): Other (See Comments), reaction, Unknown-Unspecified   Pins and needles sensation   Other reaction(s): Other (See Comments)   Pins and needles sensation   Reaction: tingling    Pins and needles sensation    Other reaction(s): Other (See Comments)   Pins and needles sensation    Reaction: tingling    Other Reaction(s): Unknown

## 2025-04-28 NOTE — PROGRESS NOTES
AUDIOLOGY EVALUATION    Kathy Turk had Audiometry performed today.    The patient reports hearing loss.     Results as follows:    Audiometry    Test Performed - Comprehensive Audiogram    Type of Loss - Right Ear: abnormal hearing: degree of loss is normal to moderately severe sensorineural hearing loss                           Left Ear: abnormal hearing: degree of loss is normal to moderately severe sensorineural hearing loss     SRT   Measurement Right Ear Left Ear   Value 20 20   Unit dB dB     Discrimination  Measurement Right Ear Left Ear   Value 96% 96%   Unit dB dB     Recommend  Binaural amplification and annual audios    Lelo Washington [facility]  Audiologist

## 2025-05-01 ENCOUNTER — PATIENT MESSAGE (OUTPATIENT)
Dept: NEUROLOGY | Age: 80
End: 2025-05-01

## 2025-05-01 ENCOUNTER — TELEPHONE (OUTPATIENT)
Age: 80
End: 2025-05-01

## 2025-05-01 NOTE — TELEPHONE ENCOUNTER
Javon Kellogg,     Had my last appt. with you on 24.     Still having low oxygen levels that is accumulative and occur as day progresses.  I am currently having respiratory therapy.  My therapist wants to know how high my heart rate can safely go while exercising on treadmill, etc.  I thought one of my doctors told me 80 but want to know your thoughts.  Briefly, I have that large PFO, Atrial Septal Aneurysm and incomplete RBBB. Can you please have an answer for me prior to Tuesday (25) my next appt.     Many thanks,  Kathy LERMA - 1945

## 2025-05-01 NOTE — TELEPHONE ENCOUNTER
Brooks Kellogg MD        There is no ceiling.  HR 80-85 BPM is the target HR for moderate intensity exercise.

## 2025-05-05 ENCOUNTER — TELEPHONE (OUTPATIENT)
Dept: SLEEP MEDICINE | Age: 80
End: 2025-05-05

## 2025-05-05 NOTE — TELEPHONE ENCOUNTER
I have called the patient and let her know that they do have an order and it has been processed. She had no further questions or concerns at this time.

## 2025-05-05 NOTE — TELEPHONE ENCOUNTER
Patient says that [facility] told her that they have not received a prescription for new cpap supplies . Ask if someone would give her a call

## 2025-06-04 ENCOUNTER — TELEPHONE (OUTPATIENT)
Dept: PULMONOLOGY | Age: 80
End: 2025-06-04

## 2025-06-12 ENCOUNTER — OFFICE VISIT (OUTPATIENT)
Dept: NEUROLOGY | Age: 80
End: 2025-06-12

## 2025-06-12 DIAGNOSIS — G24.3 CERVICAL DYSTONIA: Primary | ICD-10-CM

## 2025-06-12 NOTE — PROGRESS NOTES
[facility] NEUROLOGY  [address], [address]  [place] [address]  Phone: [phone] Fax [phone]  Dr. Andrade Jennings        Patient: Mrs. Kathy Turk  Provider: Andrade Jennings MD     Procedure note:  Botulinum Toxin injections     Indication: Cervical Dystonia        Subjective:      Patient presents for follow-up and chemodenervation for cervical dystonia.    She is unaccompanied for today's visit.  She was last seen March 2025.    Patient has a history of cervical dystonia, previously followed at the [facility], diagnosed in approximately 2018.  She has been receiving chemodenervation for many years before relocating to the [facility].      Symptoms are described as a sensation and increased neck stiffness and a tendency for head pull to the left which is worse when performing activities. She continues to take baclofen four times a day for symptomatic management in addition to the Botox injections.  She denies any other prior medication trials.    She has had several injections over the last year.  Previous injections have been met with improvement in neck stiffness and head pulling to the left.  However she did note more wearing off with the last injection after approximately 8 weeks. She otherwise denies any adverse effects. No dysphagia (although it has been reported with SCM injections in the past).    She continues to report some worsening dyspnea with exertion worsened by postural changes such as bending over.  She has been working with pulmonary rehab and has also been undergoing workup with the pulmonologist.    Denies any weakness or tremor of the upper extremities.  She notes some mild balance difficulty but otherwise is walking unassisted with no history of recent falls. She reports a prior MRI of the brain that showed a \"silent\" cerebellar infarct.  There was also noted to be some small vessel ischemic change.       Past medical history, surgical history, social